# Patient Record
Sex: FEMALE | Race: WHITE | NOT HISPANIC OR LATINO | URBAN - METROPOLITAN AREA
[De-identification: names, ages, dates, MRNs, and addresses within clinical notes are randomized per-mention and may not be internally consistent; named-entity substitution may affect disease eponyms.]

---

## 2019-09-09 ENCOUNTER — EMERGENCY (EMERGENCY)
Facility: HOSPITAL | Age: 39
LOS: 1 days | Discharge: HOME | End: 2019-09-09
Attending: STUDENT IN AN ORGANIZED HEALTH CARE EDUCATION/TRAINING PROGRAM | Admitting: EMERGENCY MEDICINE
Payer: COMMERCIAL

## 2019-09-09 VITALS
TEMPERATURE: 99 F | HEART RATE: 67 BPM | SYSTOLIC BLOOD PRESSURE: 115 MMHG | RESPIRATION RATE: 16 BRPM | OXYGEN SATURATION: 97 % | DIASTOLIC BLOOD PRESSURE: 78 MMHG

## 2019-09-09 PROCEDURE — 99282 EMERGENCY DEPT VISIT SF MDM: CPT

## 2019-09-09 PROCEDURE — 90471 IMMUNIZATION ADMIN: CPT

## 2019-09-09 PROCEDURE — 90715 TDAP VACCINE 7 YRS/> IM: CPT

## 2019-09-09 PROCEDURE — 99283 EMERGENCY DEPT VISIT LOW MDM: CPT | Mod: 25

## 2019-09-09 PROCEDURE — 12002 RPR S/N/AX/GEN/TRNK2.6-7.5CM: CPT

## 2019-09-09 RX ORDER — BACITRACIN ZINC 500 UNIT/G
1 OINTMENT IN PACKET (EA) TOPICAL ONCE
Refills: 0 | Status: COMPLETED | OUTPATIENT
Start: 2019-09-09 | End: 2019-09-09

## 2019-09-09 RX ORDER — TETANUS TOXOID, REDUCED DIPHTHERIA TOXOID AND ACELLULAR PERTUSSIS VACCINE, ADSORBED 5; 2.5; 8; 8; 2.5 [IU]/.5ML; [IU]/.5ML; UG/.5ML; UG/.5ML; UG/.5ML
0.5 SUSPENSION INTRAMUSCULAR ONCE
Refills: 0 | Status: COMPLETED | OUTPATIENT
Start: 2019-09-09 | End: 2019-09-09

## 2019-09-09 RX ADMIN — Medication 1 APPLICATION(S): at 12:28

## 2019-09-09 RX ADMIN — TETANUS TOXOID, REDUCED DIPHTHERIA TOXOID AND ACELLULAR PERTUSSIS VACCINE, ADSORBED 0.5 MILLILITER(S): 5; 2.5; 8; 8; 2.5 SUSPENSION INTRAMUSCULAR at 11:33

## 2019-09-09 NOTE — ED ADULT NURSE NOTE - PMH
"              After Visit Summary   7/5/2018    Shayne Douglass    MRN: [id number]           Patient Information     Date Of Birth          1958        Visit Information        Provider Department      7/5/2018 10:00 AM Ameena Mata MD [facility]        Today's Diagnoses     Internal nasal lesion    -  1       Follow-ups after your visit        Your next 10 appointments already scheduled     Sep 12, 2018  7:30 AM CDT   Return Visit with Ras White MD   [facility] ([address] [id number] [phone]              Who to contact     If you have questions or need follow up information about today's clinic visit or your schedule please contact [facility] directly at [phone].  Normal or non-critical lab and imaging results will be communicated to you by MyChart, letter or phone within 4 business days after the clinic has received the results. If you do not hear from us within 7 days, please contact the clinic through MyChart or phone. If you have a critical or abnormal lab result, we will notify you by phone as soon as possible.  Submit refill requests through [address] or call your pharmacy and they will forward the refill request to us. Please allow 3 business days for your refill to be completed.          Additional Information About Your Visit        Care EveryWhere ID     This is your Care EveryWhere ID. This could be used by other organizations to access your [place] medical records  [id number]        Your Vitals Were     Pulse Temperature Respirations Height Pulse Oximetry BMI (Body Mass Index)    72 97.7  F (36.5  C) (Oral) 16 1.676 m (5' 6\") 96% 28.41 kg/m2       Blood Pressure from Last 3 Encounters:   07/05/18 127/79   06/07/18 130/84   05/07/18 107/62    Weight from Last 3 Encounters:   07/05/18 79.8 kg (176 lb)   05/01/18 79.5 kg (175 lb 3.2 oz)   02/28/18 78 kg (172 lb)              Today, " you had the following     No orders found for display       Primary Care Provider Office Phone # Fax #    Sol Anderson, HAYLEY [facility] [phone]       [address] [facility] [address]        Equal Access to Services     MILAN HOLT : Hadii aad ku hadrebeccao Soomaali, waaxda luqadaha, qaybta kaalmada adeegyada, waxay idiin hayaan adedarryl herndon lagloria . So [facility] [phone].    ATENCIÓN: Si habla español, tiene a garcia disposición servicios gratuitos de asistencia lingüística. Llame al [phone].    We comply with applicable federal civil rights laws and [place] laws. We do not discriminate on the basis of race, color, national origin, age, disability, sex, sexual orientation, or gender identity.            Thank you!     Thank you for choosing [facility]  for your care. Our goal is always to provide you with excellent care. Hearing back from our patients is one way we can continue to improve our services. Please take a few minutes to complete the written survey that you may receive in the mail after your visit with us. Thank you!             Your Updated Medication List - Protect others around you: Learn how to safely use, store and throw away your medicines at www.disposemymeds.org.          This list is accurate as of 7/5/18 11:15 AM.  Always use your most recent med list.                   Brand Name Dispense Instructions for use Diagnosis    acyclovir 200 MG capsule    ZOVIRAX    25 capsule    Take 1-2 capsules (200-400 mg) by mouth 5 times daily TAKE AS DIRECTED FOR COLD SORES    Recurrent cold sores       ascorbic acid 1000 MG Tabs    vitamin C     Take 1,000 mg by mouth daily.        CALCIUM 1200 PO      Take  by mouth daily.        fish oil-omega-3 fatty acids 1000 MG capsule      ONCE DAILY        HYDROcodone-acetaminophen 5-325 MG per tablet    NORCO    5 tablet    Take 1-2 tablets by mouth every 4 hours as needed for severe pain (Moderate to Severe Pain)    WILLIAMS  (stress urinary incontinence, female)       MAGNESIUM PO      Take  by mouth.        metroNIDAZOLE 0.75 % topical gel    METROGEL    30 g    Apply topically daily as needed.    Vaginal irritation       Multi-vitamin Tabs tablet      Take 1 tablet by mouth daily.        order for DME     30 g    Profile Rx: patient will contact pharmacy when needed   Hydrocortisone 2.5% cream and then added 0.3 grams of Clotrimazole Powder to make it 1%. Previous pharmacy:  [facility] and the pharmacist said that they used 30 grams    Candidal intertrigo       VITAMIN D-3 PO      Take 4,000 mg by mouth.           No pertinent past medical history <<----- Click to add NO pertinent Past Medical History

## 2019-09-09 NOTE — ED ADULT NURSE NOTE - OBJECTIVE STATEMENT
Received the patient in the Er. Patient is alert and oriented. skin warm and dry. Sustained a laceration to ankle.

## 2019-09-09 NOTE — ED PROVIDER NOTE - NSFOLLOWUPINSTRUCTIONS_ED_ALL_ED_FT
Return to ED or your primary care doctor in 12-14 days for suture removal. Return sooner if you notice signs of infections, redness, warmth or discharge from site.     Sutured Wound Care  ImageSutures are stitches that can be used to close wounds. Taking care of your wound properly can help prevent pain and infection. It can also help your wound to heal more quickly.    How is this treated?  Wound Care     Keep the wound clean and dry.  If you were given a bandage (dressing), change it at least one time per day or as told by your doctor. You should also change it if it gets wet or dirty.  Keep the wound completely dry for the first 24 hours or as told by your doctor. After that time, you may shower or bathe. However, make sure that the wound is not soaked in water until the sutures have been removed.  Clean the wound one time each day or as told by your doctor.    Wash the wound with soap and water.  Rinse the wound with water to remove all soap.  Pat the wound dry with a clean towel. Do not rub the wound.    After cleaning the wound, put a thin layer of antibiotic ointment on it as told your doctor. This ointment:    Helps to prevent infection.  Keeps the bandage from sticking to the wound.    Have the sutures removed as told by your doctor.  General Instructions     Take or apply medicines only as told by your doctor.  To help prevent scarring, make sure to cover your wound with sunscreen whenever you are outside after the sutures are removed and the wound is healed. Make sure to wear a sunscreen of at least 30 SPF.  If you were prescribed an antibiotic medicine or ointment, finish all of it even if you start to feel better.  Do not scratch or pick at the wound.  Keep all follow-up visits as told by your doctor. This is important.  Check your wound every day for signs of infection. Watch for:    Redness, swelling, or pain.  Fluid, blood, or pus.    Raise (elevate) the injured area above the level of your heart while you are sitting or lying down, if possible.  Avoid stretching your wound.  Drink enough fluids to keep your pee (urine) clear or pale yellow.  Contact a doctor if:  You were given a tetanus shot and you have any of these where the needle went in:    Swelling.  Very bad pain.  Redness.  Bleeding.    You have a fever.  A wound that was closed breaks open.  You notice a bad smell coming from the wound.  You notice something coming out of the wound, such as wood or glass.  Medicine does not help your pain.  You have any of these at the site of the wound.    More redness.  More swelling.  More pain.    You have any of these coming from the wound.    Fluid.  Blood.  Pus.    You notice a change in the color of your skin near the wound.  You need to change the bandage often due to fluid, blood, or pus coming from the wound.  You have a new rash.  You have numbness around the wound.  Get help right away if:  You have very bad swelling around the wound.  Your pain suddenly gets worse and is very bad.  You have painful lumps near the wound or on skin that is anywhere on your body.  You have a red streak going away from the wound.  The wound is on your hand or foot and you cannot move a finger or toe like normal.  The wound is on your hand or foot and you notice that your fingers or toes look pale or bluish.  This information is not intended to replace advice given to you by your health care provider. Make sure you discuss any questions you have with your health care provider.

## 2019-09-09 NOTE — ED PROVIDER NOTE - ATTENDING CONTRIBUTION TO CARE
37yo F no pmh p/w lac to left ankle - medial. pt opened a door into her foot and sustained a lac.  no numbness, no tingling. able to ambulate. unsure of last tetanus  Gen:  Well appearning in NAD  Head:  NC/AT  Resp: No distress   Ext: left ankle - medial 5 cm lac, tendon exposed but intact, neurovasc intact  Skin: warm and dry as visualized  clean, lac repair, dc, f/u 10 days

## 2019-09-09 NOTE — ED PROVIDER NOTE - OBJECTIVE STATEMENT
Patient is a 38 year old female with no significant past medical history presenting to the ED with a laceration to her left ankle. This morning the patient opened a metal door and was stepping through when the corner of the door hit and cut the medial aspect of her left ankle. Upon seeing the injury, the patient immediately went to the hospital. At first, there was no pain, but now she is experiencing minor throbbing pain. She states that the area is starting to bruise. Patient denies fever, chills, SOB, CP, nausea, vomiting, diarrhea.

## 2019-09-09 NOTE — ED PROVIDER NOTE - SKIN, MLM
Skin normal color for race, warm. No evidence of rash. 4cm laceration to the medial aspect of her left ankle. Underlying fascia and tendon exposed.

## 2019-09-09 NOTE — ED PROVIDER NOTE - PATIENT PORTAL LINK FT
You can access the FollowMyHealth Patient Portal offered by [facility] by registering at the following website: [email or web address]. By joining [facility]’s FollowMyHealth portal, you will also be able to view your health information using other applications (apps) compatible with our system.

## 2022-09-08 PROBLEM — Z78.9 OTHER SPECIFIED HEALTH STATUS: Chronic | Status: ACTIVE | Noted: 2019-09-09

## 2022-10-06 ENCOUNTER — APPOINTMENT (OUTPATIENT)
Dept: ULTRASOUND IMAGING | Facility: IMAGING CENTER | Age: 42
End: 2022-10-06

## 2022-10-06 ENCOUNTER — OUTPATIENT (OUTPATIENT)
Dept: OUTPATIENT SERVICES | Facility: HOSPITAL | Age: 42
LOS: 1 days | End: 2022-10-06
Payer: COMMERCIAL

## 2022-10-06 ENCOUNTER — APPOINTMENT (OUTPATIENT)
Dept: MAMMOGRAPHY | Facility: IMAGING CENTER | Age: 42
End: 2022-10-06

## 2022-10-06 DIAGNOSIS — Z00.8 ENCOUNTER FOR OTHER GENERAL EXAMINATION: ICD-10-CM

## 2022-10-06 PROBLEM — Z00.00 ENCOUNTER FOR PREVENTIVE HEALTH EXAMINATION: Status: ACTIVE | Noted: 2022-10-06

## 2022-10-06 PROCEDURE — 77063 BREAST TOMOSYNTHESIS BI: CPT | Mod: 26

## 2022-10-06 PROCEDURE — 77067 SCR MAMMO BI INCL CAD: CPT | Mod: 26

## 2022-10-06 PROCEDURE — 77067 SCR MAMMO BI INCL CAD: CPT

## 2022-10-06 PROCEDURE — 77063 BREAST TOMOSYNTHESIS BI: CPT
